# Patient Record
Sex: FEMALE | Race: WHITE | Employment: OTHER | URBAN - METROPOLITAN AREA
[De-identification: names, ages, dates, MRNs, and addresses within clinical notes are randomized per-mention and may not be internally consistent; named-entity substitution may affect disease eponyms.]

---

## 2021-11-12 ENCOUNTER — HOSPITAL ENCOUNTER (OUTPATIENT)
Age: 81
Discharge: HOME | End: 2021-11-12
Payer: MEDICARE

## 2021-11-12 VITALS
RESPIRATION RATE: 18 BRPM | WEIGHT: 119 LBS | TEMPERATURE: 98 F | BODY MASS INDEX: 21.35 KG/M2 | SYSTOLIC BLOOD PRESSURE: 159 MMHG | HEIGHT: 62.5 IN | HEART RATE: 70 BPM | DIASTOLIC BLOOD PRESSURE: 57 MMHG | OXYGEN SATURATION: 100 %
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
## 2021-11-12 DIAGNOSIS — S00.01XA ABRASION OF SCALP, INITIAL ENCOUNTER: ICD-10-CM

## 2021-11-12 PROCEDURE — 99204 OFFICE O/P NEW MOD 45 MIN: CPT | Performed by: PHYSICIAN ASSISTANT

## 2021-11-12 PROCEDURE — 90715 TDAP VACCINE 7 YRS/> IM: CPT | Performed by: PHYSICIAN ASSISTANT

## 2021-11-12 PROCEDURE — 90471 IMMUNIZATION ADMIN: CPT | Performed by: PHYSICIAN ASSISTANT

## 2021-11-12 NOTE — ED INITIAL ASSESSMENT (HPI)
Patient is here with a laceration to the back of her head after tripping on a shoe at her daughters house. She denies any loss of consciousness.

## 2021-11-12 NOTE — ED PROVIDER NOTES
Patient Seen in: [facility]      History   Patient presents with:  Fall    Stated Complaint: Fall    Subjective:   HPI    42-year-old female with past medical history of hypertension here for evaluation of fall, head injury.  History Is provi intact. Pupils: Pupils are equal, round, and reactive to light. Cardiovascular:      Rate and Rhythm: Normal rate. Pulmonary:      Effort: Pulmonary effort is normal.   Abdominal:      General: Abdomen is flat.    Musculoskeletal:         General:

## 2021-11-24 ENCOUNTER — OFFICE VISIT (OUTPATIENT)
Dept: GASTROENTEROLOGY | Facility: CLINIC | Age: 81
End: 2021-11-24
Payer: MEDICARE

## 2021-11-24 ENCOUNTER — TELEPHONE (OUTPATIENT)
Dept: GASTROENTEROLOGY | Facility: CLINIC | Age: 81
End: 2021-11-24

## 2021-11-24 VITALS
BODY MASS INDEX: 21.35 KG/M2 | HEIGHT: 62.5 IN | WEIGHT: 119 LBS | SYSTOLIC BLOOD PRESSURE: 179 MMHG | HEART RATE: 83 BPM | DIASTOLIC BLOOD PRESSURE: 69 MMHG

## 2021-11-24 DIAGNOSIS — R19.4 CHANGE IN BOWEL HABITS: ICD-10-CM

## 2021-11-24 DIAGNOSIS — R19.4 CHANGE IN BOWEL HABITS: Primary | ICD-10-CM

## 2021-11-24 DIAGNOSIS — R10.84 ABDOMINAL PAIN, GENERALIZED: Primary | ICD-10-CM

## 2021-11-24 DIAGNOSIS — R63.0 LOSS OF APPETITE FOR MORE THAN 2 WEEKS: ICD-10-CM

## 2021-11-24 DIAGNOSIS — K59.04 CHRONIC IDIOPATHIC CONSTIPATION: ICD-10-CM

## 2021-11-24 PROCEDURE — 99204 OFFICE O/P NEW MOD 45 MIN: CPT | Performed by: INTERNAL MEDICINE

## 2021-11-24 RX ORDER — ASPIRIN 81 MG/1
81 TABLET ORAL NIGHTLY
COMMUNITY

## 2021-11-24 RX ORDER — POLYETHYLENE GLYCOL 3350, SODIUM SULFATE ANHYDROUS, SODIUM BICARBONATE, SODIUM CHLORIDE, POTASSIUM CHLORIDE 236; 22.74; 6.74; 5.86; 2.97 G/4L; G/4L; G/4L; G/4L; G/4L
4 POWDER, FOR SOLUTION ORAL ONCE
Qty: 1 EACH | Refills: 0 | Status: SHIPPED | OUTPATIENT
Start: 2021-11-24 | End: 2021-11-24

## 2021-11-24 RX ORDER — TIMOLOL MALEATE 5 MG/ML
SOLUTION/ DROPS OPHTHALMIC
COMMUNITY
Start: 2021-06-12

## 2021-11-24 RX ORDER — DORZOLAMIDE HCL 20 MG/ML
SOLUTION/ DROPS OPHTHALMIC
COMMUNITY
Start: 2021-05-27

## 2021-11-24 RX ORDER — LATANOPROST 50 UG/ML
1 SOLUTION/ DROPS OPHTHALMIC NIGHTLY
COMMUNITY

## 2021-11-24 NOTE — PROGRESS NOTES
HPI:    Patient ID: Valentina Smith is a 78year old woman fit and trim BMI 21.4, uncontrolled hypertension today who presents with her very attentive son for evaluation of recent abdominal pain and change in bowel patterns.     Ms. Diomedes Patel relates an acute ill • latanoprost 0.005 % Ophthalmic Solution 1 drop nightly.      • FLUZONE HIGH-DOSE 0.5 ML Intramuscular Suspension Prefilled Syringe ADM 0.5ML IM UTD (Patient not taking: No sig reported)  0         Allergies:  Codeine                     Comment:tatiana diarrhea  · Significant associated change in bowel patterns which is ongoing  · Patient has cut back on bread, gluten products. · Persistent loss of appetite since then  · Suspect viral syndrome or infectious event, even acute bacterial enteritis.   Possib

## 2022-01-04 ENCOUNTER — TELEPHONE (OUTPATIENT)
Dept: GASTROENTEROLOGY | Facility: CLINIC | Age: 82
End: 2022-01-04

## 2022-01-04 NOTE — TELEPHONE ENCOUNTER
Patient states she received a call from Dr [employer] office and she is currently in [place] (7 hours ahead). Has questions regarding forms that was sent to her. Please call [place] number to get a hold of patient. Thank you.

## 2022-01-04 NOTE — TELEPHONE ENCOUNTER
I spoke to Lacy Deborah Waller at the [facility]. It was actually the PAT department calling the pt to talk to her about COVID testing for her upcoming colonoscopy on . I gave Lacy Cabrera Zach Daley the pt's phone number in [place].  She already has the cell phone number

## 2022-01-12 ENCOUNTER — SURGERY CENTER DOCUMENTATION (OUTPATIENT)
Dept: SURGERY | Age: 82
End: 2022-01-12

## 2022-01-12 NOTE — PROCEDURES
[facility]      COLONOSCOPY PROCEDURE REPORT     DATE OF PROCEDURE:  1/12/2022     PCP: None Pcp     PREOPERATIVE DIAGNOSIS: Change in bowel habits     POSTOPERATIVE DIAGNOSIS:  See impression. SURGEON:  Jonahton Aaron

## 2022-04-25 ENCOUNTER — PATIENT OUTREACH (OUTPATIENT)
Dept: CASE MANAGEMENT | Age: 82
End: 2022-04-25

## 2022-10-14 ENCOUNTER — HOSPITAL ENCOUNTER (OUTPATIENT)
Dept: GENERAL RADIOLOGY | Facility: HOSPITAL | Age: 82
Discharge: HOME | End: 2022-10-14
Attending: PODIATRIST
Payer: MEDICARE

## 2022-10-14 ENCOUNTER — OFFICE VISIT (OUTPATIENT)
Dept: PODIATRY CLINIC | Facility: CLINIC | Age: 82
End: 2022-10-14
Payer: MEDICARE

## 2022-10-14 DIAGNOSIS — R52 PAIN: ICD-10-CM

## 2022-10-14 DIAGNOSIS — M20.42 HAMMERTOE OF LEFT FOOT: ICD-10-CM

## 2022-10-14 DIAGNOSIS — R52 PAIN: Primary | ICD-10-CM

## 2022-10-14 PROCEDURE — 73630 X-RAY EXAM OF FOOT: CPT | Performed by: PODIATRIST

## 2022-10-14 PROCEDURE — 99203 OFFICE O/P NEW LOW 30 MIN: CPT | Performed by: PODIATRIST

## 2022-10-25 ENCOUNTER — OFFICE VISIT (OUTPATIENT)
Dept: PODIATRY CLINIC | Facility: CLINIC | Age: 82
End: 2022-10-25
Payer: MEDICARE

## 2022-10-25 DIAGNOSIS — I73.9 PERIPHERAL VASCULAR DISEASE (HCC): ICD-10-CM

## 2022-10-25 DIAGNOSIS — M20.42 ACQUIRED HAMMERTOE OF LEFT FOOT: Primary | ICD-10-CM

## 2022-10-25 DIAGNOSIS — M79.672 LEFT FOOT PAIN: ICD-10-CM

## 2022-10-25 PROCEDURE — 1126F AMNT PAIN NOTED NONE PRSNT: CPT | Performed by: PODIATRIST

## 2022-10-25 PROCEDURE — 99213 OFFICE O/P EST LOW 20 MIN: CPT | Performed by: PODIATRIST

## 2022-11-04 ENCOUNTER — HOSPITAL ENCOUNTER (OUTPATIENT)
Dept: ULTRASOUND IMAGING | Facility: HOSPITAL | Age: 82
Discharge: HOME | End: 2022-11-04
Attending: PODIATRIST
Payer: MEDICARE

## 2022-11-04 DIAGNOSIS — M79.672 LEFT FOOT PAIN: ICD-10-CM

## 2022-11-04 DIAGNOSIS — I73.9 PERIPHERAL VASCULAR DISEASE (HCC): ICD-10-CM

## 2022-11-04 PROCEDURE — 93926 LOWER EXTREMITY STUDY: CPT | Performed by: PODIATRIST

## 2022-11-07 ENCOUNTER — TELEPHONE (OUTPATIENT)
Dept: PODIATRY CLINIC | Facility: CLINIC | Age: 82
End: 2022-11-07

## 2022-11-09 ENCOUNTER — OFFICE VISIT (OUTPATIENT)
Dept: PODIATRY CLINIC | Facility: CLINIC | Age: 82
End: 2022-11-09
Payer: MEDICARE

## 2022-11-09 ENCOUNTER — TELEPHONE (OUTPATIENT)
Dept: PODIATRY CLINIC | Facility: CLINIC | Age: 82
End: 2022-11-09

## 2022-11-09 DIAGNOSIS — M20.42 ACQUIRED HAMMERTOE OF LEFT FOOT: Primary | ICD-10-CM

## 2022-11-09 DIAGNOSIS — M79.672 LEFT FOOT PAIN: ICD-10-CM

## 2022-11-09 PROCEDURE — 99214 OFFICE O/P EST MOD 30 MIN: CPT | Performed by: PODIATRIST

## 2022-11-09 NOTE — TELEPHONE ENCOUNTER
Procedure:   Left 2nd and 3rd toe PIPJ arthroplaty  CPT code:   Length of Surgery: 45 min  Any Instruments: 2 0.045 k wire  Call patient: within 24 hours  Anesthesia: MAC  Location: [facility]  Assistance: none  Pacemaker: No  Anticoagulants:  Baby aspirin  Nickel Allergy: No  Latex Allergy: No  Diagnosis/ICD Code:   No diagnosis found.

## 2022-11-10 NOTE — TELEPHONE ENCOUNTER
Called patient this date and she is requesting surgery on 12/1/22. This was scheduled at [facility] and patient told I would call her next week to review all details.

## 2022-12-01 ENCOUNTER — PROCEDURE (OUTPATIENT)
Dept: SURGERY | Age: 82
End: 2022-12-01

## 2022-12-01 ENCOUNTER — TELEPHONE (OUTPATIENT)
Dept: PODIATRY CLINIC | Facility: CLINIC | Age: 82
End: 2022-12-01

## 2022-12-01 RX ORDER — HYDROCODONE BITARTRATE AND ACETAMINOPHEN 5; 325 MG/1; MG/1
1 TABLET ORAL EVERY 6 HOURS PRN
Qty: 28 TABLET | Refills: 0 | Status: SHIPPED | OUTPATIENT
Start: 2022-12-01 | End: 2022-12-08

## 2022-12-01 NOTE — TELEPHONE ENCOUNTER
Pt requesting to change post op to 12/9/22 at [facility].  Please advise

## 2022-12-02 NOTE — TELEPHONE ENCOUNTER
S/w pt and she wanted to see if any openings on 12/9 as she would rather go to [facility] than [facility]. I advised her no openings, but will call her if any cancellations.

## 2022-12-07 ENCOUNTER — OFFICE VISIT (OUTPATIENT)
Dept: PODIATRY CLINIC | Facility: CLINIC | Age: 82
End: 2022-12-07
Payer: MEDICARE

## 2022-12-07 ENCOUNTER — HOSPITAL ENCOUNTER (OUTPATIENT)
Dept: GENERAL RADIOLOGY | Age: 82
Discharge: HOME | End: 2022-12-07
Attending: PODIATRIST
Payer: MEDICARE

## 2022-12-07 DIAGNOSIS — Z98.890 S/P FOOT SURGERY, LEFT: Primary | ICD-10-CM

## 2022-12-07 DIAGNOSIS — Z98.890 S/P FOOT SURGERY, LEFT: ICD-10-CM

## 2022-12-07 PROCEDURE — 99024 POSTOP FOLLOW-UP VISIT: CPT | Performed by: PODIATRIST

## 2022-12-07 PROCEDURE — 73630 X-RAY EXAM OF FOOT: CPT | Performed by: PODIATRIST

## 2022-12-07 PROCEDURE — 1126F AMNT PAIN NOTED NONE PRSNT: CPT | Performed by: PODIATRIST

## 2022-12-14 ENCOUNTER — OFFICE VISIT (OUTPATIENT)
Dept: PODIATRY CLINIC | Facility: CLINIC | Age: 82
End: 2022-12-14
Payer: MEDICARE

## 2022-12-14 DIAGNOSIS — Z98.890 S/P FOOT SURGERY, LEFT: Primary | ICD-10-CM

## 2022-12-14 PROCEDURE — 1126F AMNT PAIN NOTED NONE PRSNT: CPT | Performed by: PODIATRIST

## 2022-12-14 PROCEDURE — 99024 POSTOP FOLLOW-UP VISIT: CPT | Performed by: PODIATRIST

## 2022-12-21 ENCOUNTER — OFFICE VISIT (OUTPATIENT)
Dept: PODIATRY CLINIC | Facility: CLINIC | Age: 82
End: 2022-12-21
Payer: MEDICARE

## 2022-12-21 ENCOUNTER — HOSPITAL ENCOUNTER (OUTPATIENT)
Dept: GENERAL RADIOLOGY | Age: 82
Discharge: HOME | End: 2022-12-21
Attending: PODIATRIST
Payer: MEDICARE

## 2022-12-21 DIAGNOSIS — Z98.890 S/P FOOT SURGERY, LEFT: Primary | ICD-10-CM

## 2022-12-21 DIAGNOSIS — Z98.890 S/P FOOT SURGERY, LEFT: ICD-10-CM

## 2022-12-21 PROCEDURE — 99024 POSTOP FOLLOW-UP VISIT: CPT | Performed by: PODIATRIST

## 2022-12-21 PROCEDURE — 73630 X-RAY EXAM OF FOOT: CPT | Performed by: PODIATRIST

## 2022-12-21 RX ORDER — LISINOPRIL 40 MG/1
40 TABLET ORAL NIGHTLY
COMMUNITY
Start: 2022-10-21

## 2023-01-09 ENCOUNTER — OFFICE VISIT (OUTPATIENT)
Dept: PODIATRY CLINIC | Facility: CLINIC | Age: 83
End: 2023-01-09
Payer: MEDICARE

## 2023-01-09 DIAGNOSIS — Z98.890 S/P FOOT SURGERY, LEFT: Primary | ICD-10-CM

## 2023-01-09 PROCEDURE — 1126F AMNT PAIN NOTED NONE PRSNT: CPT | Performed by: PODIATRIST

## 2023-01-09 PROCEDURE — 99024 POSTOP FOLLOW-UP VISIT: CPT | Performed by: PODIATRIST

## 2023-04-19 ENCOUNTER — TELEPHONE (OUTPATIENT)
Dept: PODIATRY CLINIC | Facility: CLINIC | Age: 83
End: 2023-04-19

## 2023-04-19 NOTE — TELEPHONE ENCOUNTER
Dr. Susan Echavarria with patient s/p left foot surgery 12/1/23 reports left foot is still swollen has been walking around barefoot currently in [facility]. Reports her left second toe still looks \"funny\" to her denies pain numbness or tingling just overall swelling that persists since surgery to left foot. Has appointment scheduled for 4/24/23 with you to discuss further. Advised patient to avoid walking barefoot and wear supportive tennis shoes in interim. Patient thanked Tarri Mail for call and states looks forward to discussing further with you 4/24/23.

## 2023-04-24 ENCOUNTER — OFFICE VISIT (OUTPATIENT)
Dept: PODIATRY CLINIC | Facility: CLINIC | Age: 83
End: 2023-04-24

## 2023-04-24 ENCOUNTER — HOSPITAL ENCOUNTER (OUTPATIENT)
Dept: GENERAL RADIOLOGY | Facility: HOSPITAL | Age: 83
Discharge: HOME | End: 2023-04-24
Attending: PODIATRIST
Payer: MEDICARE

## 2023-04-24 DIAGNOSIS — M79.675 PAIN IN LEFT TOE(S): ICD-10-CM

## 2023-04-24 DIAGNOSIS — R60.0 EDEMA OF TOE: ICD-10-CM

## 2023-04-24 DIAGNOSIS — R60.0 EDEMA OF TOE: Primary | ICD-10-CM

## 2023-04-24 PROCEDURE — 73660 X-RAY EXAM OF TOE(S): CPT | Performed by: PODIATRIST

## 2023-04-24 RX ORDER — METHYLPREDNISOLONE 4 MG/1
TABLET ORAL
Qty: 1 EACH | Refills: 0 | Status: SHIPPED | OUTPATIENT
Start: 2023-04-24

## 2023-04-25 ENCOUNTER — TELEPHONE (OUTPATIENT)
Dept: PODIATRY CLINIC | Facility: CLINIC | Age: 83
End: 2023-04-25

## 2023-04-25 RX ORDER — METHYLPREDNISOLONE 4 MG/1
TABLET ORAL
Qty: 1 EACH | Refills: 0 | Status: CANCELLED | OUTPATIENT
Start: 2023-04-25

## 2023-04-25 NOTE — TELEPHONE ENCOUNTER
Patient states pharmacy has not received medication.  Please advise     methylPREDNISolone 4 MG Oral Tablet Therapy Pack

## 2023-05-15 ENCOUNTER — OFFICE VISIT (OUTPATIENT)
Dept: PODIATRY CLINIC | Facility: CLINIC | Age: 83
End: 2023-05-15

## 2023-05-15 DIAGNOSIS — R60.0 EDEMA OF TOE: Primary | ICD-10-CM

## 2023-05-15 DIAGNOSIS — M79.675 PAIN IN LEFT TOE(S): ICD-10-CM

## 2023-05-15 PROCEDURE — 99213 OFFICE O/P EST LOW 20 MIN: CPT | Performed by: PODIATRIST

## 2023-05-15 PROCEDURE — 1159F MED LIST DOCD IN RCRD: CPT | Performed by: PODIATRIST

## 2023-07-17 ENCOUNTER — HOSPITAL ENCOUNTER (OUTPATIENT)
Dept: GENERAL RADIOLOGY | Facility: HOSPITAL | Age: 83
Discharge: HOME | End: 2023-07-17
Attending: PODIATRIST
Payer: MEDICARE

## 2023-07-17 ENCOUNTER — OFFICE VISIT (OUTPATIENT)
Dept: PODIATRY CLINIC | Facility: CLINIC | Age: 83
End: 2023-07-17

## 2023-07-17 DIAGNOSIS — M79.675 PAIN IN LEFT TOE(S): ICD-10-CM

## 2023-07-17 DIAGNOSIS — R60.0 EDEMA OF TOE: ICD-10-CM

## 2023-07-17 DIAGNOSIS — R60.0 EDEMA OF TOE: Primary | ICD-10-CM

## 2023-07-17 PROCEDURE — 1125F AMNT PAIN NOTED PAIN PRSNT: CPT | Performed by: PODIATRIST

## 2023-07-17 PROCEDURE — 1159F MED LIST DOCD IN RCRD: CPT | Performed by: PODIATRIST

## 2023-07-17 PROCEDURE — 73630 X-RAY EXAM OF FOOT: CPT | Performed by: PODIATRIST

## 2023-07-17 PROCEDURE — 99213 OFFICE O/P EST LOW 20 MIN: CPT | Performed by: PODIATRIST

## 2023-07-17 NOTE — PROGRESS NOTES
[facility] [facility] Podiatry  Progress Note    Tani Vega is a 78year old female. Patient presents with: Foot Pain: Left- 2 month f/u- 2nd and 3rd digits. Patient still endorses swelling from 2nd toe and states that it goes up to ankle. Rates pain at 2/10. HPI:     This is a pleasant female with PMH of HTN. She has PMH of Left 2nd and 3rd toe PIPJ arthroplasty in December 2022. She presents to clinic today due to left 2nd toe pain and swelling f/u. She states the pains is mild but is still having some swelling. Allergies: Codeine, Penicillins, and Meloxicam   Current Outpatient Medications   Medication Sig Dispense Refill    lisinopril 40 MG Oral Tab Take 1 tablet (40 mg total) by mouth nightly. aspirin 81 MG Oral Tab EC Take 1 tablet (81 mg total) by mouth nightly. Dorzolamide HCl 2 % Ophthalmic Solution       latanoprost 0.005 % Ophthalmic Solution 1 drop nightly. timolol 0.5 % Ophthalmic Solution       omeprazole 20 MG Oral Capsule Delayed Release       simvastatin 20 MG Oral Tab       Levocetirizine Dihydrochloride 5 MG Oral Tab         Past Medical History:   Diagnosis Date    Essential hypertension     Hyperlipidemia     Thyroid disease       Past Surgical History:   Procedure Laterality Date    BACK SURGERY      CATARACT      EYE SURGERY        History reviewed. No pertinent family history. Social History    Socioeconomic History      Marital status: Single    Tobacco Use      Smoking status: Never      Smokeless tobacco: Never          REVIEW OF SYSTEMS:   Denies nausea, fever, chills  No calf pain  No other muscle or joint aches  Denies chest pain or shortness of breath. EXAM:   There were no vitals taken for this visit. Constitutional:   Patient in no apparent distress. Well kept Of normal body habitus. Alert and oriented to person, place, and time. Integumentary examination:   There are varicosities.    Skin appears moist, warm, and supple with positive hair growth. Vascular examination:   DP pulse is 2/4 Left  PT pulse is NP left  Capillary refill is immediate    Mild edema to left 2nd toe, no SOI    Neurological examination:   Vibratory (128-Hz tuning fork) sensation is present to right and is present to left. Sharp/dull is present to right and is present to left. Musculoskeletal examination:  Muscle Strength is 5/5. POP to left 2nd toe, mild      LABS & IMAGING:   No results found for: GLU, BUN, CREATSERUM, BUNCREA, ANIONGAP, GFRAA, GFRNAA, CA, NA, K, CL, CO2, OSMOCALC     No results found for: EAG, A1C     No results found. ASSESSMENT AND PLAN:   Diagnoses and all orders for this visit:    Edema of toe    Pain in left toe(s)          Plan:     Evaluated left 2nd toe which was slightly painful with improved edema. Xray Left 2nd toe: 4/24/23  CONCLUSION: Postsurgical changes at the distal aspect of the proximal phalanx, 2nd toe. DIP joint mild degenerative joint space narrowing. No focal bone destruction. No fracture or dislocation. I did explain to patient the swelling and pain could still be related to her arthroplasty surgery on 12/2022. Pt may cont wearing regular supportive tennis shoes but to avoid any high impact exercises    Medrol dose pack did not help    Did discuss possible steroid injection, will hold off due to only mild pain    Ordered left full WB xrays    RTC 1 month, if worsening will consider steroid injection and may discuss possible fusion if the PIPJ        No follow-ups on file.     Kristen Montana, YULIANA  7/17/23

## 2023-08-21 ENCOUNTER — OFFICE VISIT (OUTPATIENT)
Dept: PODIATRY CLINIC | Facility: CLINIC | Age: 83
End: 2023-08-21

## 2023-08-21 ENCOUNTER — TELEPHONE (OUTPATIENT)
Dept: PODIATRY CLINIC | Facility: CLINIC | Age: 83
End: 2023-08-21

## 2023-08-21 VITALS — DIASTOLIC BLOOD PRESSURE: 64 MMHG | SYSTOLIC BLOOD PRESSURE: 122 MMHG | HEART RATE: 67 BPM

## 2023-08-21 DIAGNOSIS — R60.0 EDEMA OF TOE: ICD-10-CM

## 2023-08-21 DIAGNOSIS — M20.42 HAMMER TOE OF LEFT FOOT: Primary | ICD-10-CM

## 2023-08-21 DIAGNOSIS — M79.675 PAIN IN LEFT TOE(S): ICD-10-CM

## 2023-08-21 PROCEDURE — 99214 OFFICE O/P EST MOD 30 MIN: CPT | Performed by: PODIATRIST

## 2023-08-21 PROCEDURE — 3074F SYST BP LT 130 MM HG: CPT | Performed by: PODIATRIST

## 2023-08-21 PROCEDURE — 3078F DIAST BP <80 MM HG: CPT | Performed by: PODIATRIST

## 2023-08-21 PROCEDURE — 1159F MED LIST DOCD IN RCRD: CPT | Performed by: PODIATRIST

## 2023-08-21 NOTE — PROGRESS NOTES
[facility] Podiatry  Progress Note    Enzo Wallace is a 78year old female. Patient presents with: Toe Pain: Follow up on left foot 2nd digit pain. Patient completed xrays taken on 7/17/23. Today in the office toe is still swollen, patient denies any pain today, denies any numbness or tingling. HPI:     This is a pleasant female with PMH of HTN. She has PMH of Left 2nd and 3rd toe PIPJ arthroplasty in December 2022. She presents to clinic today due to left 2nd toe pain and swelling f/u. She did get xrays and would like to proceed with toe surgery. Allergies: Codeine, Penicillins, and Meloxicam   Current Outpatient Medications   Medication Sig Dispense Refill    lisinopril 40 MG Oral Tab Take 1 tablet (40 mg total) by mouth nightly. aspirin 81 MG Oral Tab EC Take 1 tablet (81 mg total) by mouth nightly. Dorzolamide HCl 2 % Ophthalmic Solution       latanoprost 0.005 % Ophthalmic Solution 1 drop nightly. timolol 0.5 % Ophthalmic Solution       omeprazole 20 MG Oral Capsule Delayed Release       simvastatin 20 MG Oral Tab       Levocetirizine Dihydrochloride 5 MG Oral Tab         Past Medical History:   Diagnosis Date    Essential hypertension     Hyperlipidemia     Thyroid disease       Past Surgical History:   Procedure Laterality Date    BACK SURGERY      CATARACT      EYE SURGERY        No family history on file. Social History    Socioeconomic History      Marital status: Single    Tobacco Use      Smoking status: Never      Smokeless tobacco: Never          REVIEW OF SYSTEMS:   Denies nausea, fever, chills  No calf pain  No other muscle or joint aches  Denies chest pain or shortness of breath. EXAM:   /64 (BP Location: Right arm, Patient Position: Sitting, Cuff Size: adult)   Pulse 67     Constitutional:   Patient in no apparent distress. Well kept Of normal body habitus. Alert and oriented to person, place, and time.   Integumentary examination:   There are varicosities. Skin appears moist, warm, and supple with positive hair growth. Vascular examination:   DP pulse is 2/4 Left  PT pulse is NP left  Capillary refill is immediate    Mild edema to left 2nd toe    Neurological examination:   Vibratory (128-Hz tuning fork) sensation is present to right and is present to left. Sharp/dull is present to right and is present to left. Musculoskeletal examination:  Muscle Strength is 5/5. POP to left 2nd toe with mild contracture at the PIPJ      LABS & IMAGING:   No results found for: GLU, BUN, CREATSERUM, BUNCREA, ANIONGAP, GFRAA, GFRNAA, CA, NA, K, CL, CO2, OSMOCALC     No results found for: EAG, A1C     No results found. ASSESSMENT AND PLAN:   Diagnoses and all orders for this visit:    Iván Agustina toe of left foot    Edema of toe    Pain in left toe(s)            Plan:     Evaluated left 2nd toe which was still swollen with slight contracture. Xray Left foot full WB: 7/17/23  CONCLUSION: Mild contracture of the PIPJ 2nd toe with edema of the toe    I did explain to patient the swelling and pain could still be related to her arthroplasty surgery on 12/2022. She could be having pain from the residual contracture and pseudoarthrosis. She was made aware that even after revision surgery she could still have pain and swelling. Medrol dose pack did not help    Discussed conservative and surgical treatment options. Pt would like to proceed with surgery. Will proceed with left 2nd toe PIPJ fusion    She is on baby aspirin and will need to hold prior to surgery    The patient has been advised of the approximate disability involved for these procedures. In addition, the patient has been advised as to the alternatives of care, including continued conservative care as well as surgical procedures. The patient has failed all conservative treatment at this point.  The patient understands that if surgical procedures are performed, there are risks and complications that could occur, including but not limited to: hematoma formation, damage to the nervous system, seroma formation, development of a DVT or phlebitis, infection, painful scar tissue formation, stiffness, limited motion, delayed-union, non-union, mal-union, impaired healing, increased chance of swelling, swelling, reaction to implanted biomaterials, over-correction, under-correction with recurrence of the deformities, continued pain, loss of limb, loss of life, and the possibility that future surgery may need to be performed. The patient was given the opportunity to ask questions which were answered. The patient voiced no concerns and will consider all these options. Patient desires surgical intervention. No guarantees were given or implied. Pt consented freely to surgical intervention. I spent approximately 30 minutes discussing the surgical procedures at length. I reviewed in detail the procedure to be performed, postoperative course, disability to be expected, healing time, prognosis and the importance of their following the recommended postoperative care. Possible complications discussed included but not limited to recurrence of deformity, postoperative pain, swelling, stiffness, rejection of the implant if utilized, return to surgery, infection, residual pain, possible blood clot formation, bleeding, etc. Possible complications relating to over activity and limitations during the postoperative period were reviewed. The patient has responsibilities to help insure successful outcome of the surgery. Postoperative oral and written instructions were reviewed and postoperative course of treatment discussed in detail. Management of postoperative pain was discussed. All questions related to preoperative, operative and postoperative course of treatment were answered to their understanding and satisfaction. RTO and follow up after surgery is necessary and expected and agreed to by the patient.  The patient acknowledged understanding of the above and agrees to follow all instructions and protocols. No guarantee or warranty was given or implied as to the results of the surgery. No follow-ups on file.     Gilberto Loza DPM  8/21/23

## (undated) NOTE — LETTER
AUTHORIZATION FOR SURGICAL OPERATION OR OTHER PROCEDURE    1. I hereby authorize Dr. Dariel Moralez, and [place] [employer] [facility] staff assigned to my case to perform the following operation and/or procedure at the [facility], [facility]:    _______________________________________________________________________________________________    Cortisone Injection Left 2nd digit  _______________________________________________________________________________________________    2. My physician has explained the nature and purpose of the operation or other procedure, possible alternative methods of treatment, the risks involved, and the possibility of complication to me. I acknowledge that no guarantee has been made as to the result that may be obtained. 3.  I recognize that, during the course of this operation, or other procedure, unforseen conditions may necessitate additional or different procedure than those listed above. I, therefore, further authorize and request that the above named physician, his/her physician assistants or designees perform such procedures as are, in his/her professional opinion, necessary and desirable. 4.  Any tissue or organs removed in the operation or other procedure may be disposed of by and at the discretion of the [facility] and [facility]. 5.  I understand that in the event of a medical emergency, I will be transported by local paramedics to [facility] or other hospital emergency department. 6.  I certify that I have read and fully understand the above consent to operation and/or other procedure. 7.  I acknowledge that my physician has explained sedation/analgesia administration to me including the risks and benefits. I consent to the administration of sedation/analgesia as may be necessary or desirable in the judgement of my physician.     Witness signature: ___________________________________________________ Date:  ______/______/_____ Time:  ________ A. M.  P.M. Patient Name:  ______________________________________________________  (please print)      Patient signature:  ___________________________________________________             Relationship to Patient:           []  Parent    Responsible person                          []  Spouse  In case of minor or                    [] Other  _____________   Incompetent name:  __________________________________________________                               (please print)      _____________      Responsible person  In case of minor or  Incompetent signature:  _______________________________________________    Statement of Physician  My signature below affirms that prior to the time of the procedure, I have explained to the patient and/or his/her guardian, the risks and benefits involved in the proposed treatment and any reasonable alternative to the proposed treatment. I have also explained the risks and benefits involved in the refusal of the proposed treatment and have answered the patient's questions.                         Date:  ______/______/_______  Provider                      Signature:  __________________________________________________________       Time:  ___________ A.M    P.M.